# Patient Record
Sex: FEMALE | Race: WHITE
[De-identification: names, ages, dates, MRNs, and addresses within clinical notes are randomized per-mention and may not be internally consistent; named-entity substitution may affect disease eponyms.]

---

## 2020-07-03 ENCOUNTER — HOSPITAL ENCOUNTER (OUTPATIENT)
Dept: HOSPITAL 88 - US | Age: 65
End: 2020-07-03
Attending: INTERNAL MEDICINE
Payer: COMMERCIAL

## 2020-07-03 DIAGNOSIS — R10.10: Primary | ICD-10-CM

## 2020-07-03 PROCEDURE — 76705 ECHO EXAM OF ABDOMEN: CPT

## 2020-07-03 NOTE — DIAGNOSTIC IMAGING REPORT
EXAM: Right Upper Quadrant Ultrasound

INDICATION:        

^[id number]

^1000

^UPPER ABDOMINAL PAIN

COMPARISON: None. 

TECHNIQUE: Transverse and longitudinal images of the right upper abdomen were

obtained. 



FINDINGS:     

Liver:

Size: 17.9 cm in the right midclavicular line, increased

Appearance: Increased echogenicity, smooth contour

Mass: No focal masses



Gallbladder:

Stones/Sludge: None

Wall: 0.2 cm

Appearance: No wall thickening, pericholecystic fluid or hydrops.  

Sonographic Fields's Sign: Negative



Bile Ducts:

Intrahepatic Ducts: No dilatation

Extrahepatic Ducts: Common bile duct measures 0.5 cm, no dilatation



Pancreas:

Visualized portions of the pancreatic head, neck and proximal body are normal.



Kidneys:

Length: 

Right 9.9 cm             

Echogenicity:  Normal

Collecting System:  No hydronephrosis

Stone:  None

Cyst/Mass: None



Vessels:

Aorta: Visualized portions are normal

Inferior Vena Cava: Visualized portions are normal

Main Portal Vein: 1.0 cm, normal size with hepatopetal flow.



Free Fluid:

No ascites or pleural effusion



IMPRESSION:

Mild hepatomegaly with associated hepatic steatosis.



Otherwise, unremarkable right upper quadrant ultrasound.



Signed by: Dr. Lindsey Jones M.D. on 7/3/2020 11:13 AM

## 2024-08-02 ENCOUNTER — HOSPITAL ENCOUNTER (EMERGENCY)
Dept: HOSPITAL 88 - ER | Age: 69
Discharge: HOME | End: 2024-08-02
Payer: COMMERCIAL

## 2024-08-02 VITALS — TEMPERATURE: 98.1 F | OXYGEN SATURATION: 96 % | HEART RATE: 87 BPM

## 2024-08-02 VITALS — BODY MASS INDEX: 38.27 KG/M2 | HEIGHT: 63 IN | WEIGHT: 216 LBS

## 2024-08-02 DIAGNOSIS — I10: ICD-10-CM

## 2024-08-02 DIAGNOSIS — R79.1: Primary | ICD-10-CM

## 2024-08-02 DIAGNOSIS — M06.9: ICD-10-CM

## 2024-08-02 PROCEDURE — 99283 EMERGENCY DEPT VISIT LOW MDM: CPT
